# Patient Record
Sex: FEMALE | Employment: STUDENT | RURAL
[De-identification: names, ages, dates, MRNs, and addresses within clinical notes are randomized per-mention and may not be internally consistent; named-entity substitution may affect disease eponyms.]

---

## 2018-06-29 ENCOUNTER — OFFICE VISIT (OUTPATIENT)
Dept: FAMILY MEDICINE CLINIC | Age: 28
End: 2018-06-29

## 2018-06-29 VITALS
OXYGEN SATURATION: 98 % | DIASTOLIC BLOOD PRESSURE: 60 MMHG | RESPIRATION RATE: 20 BRPM | HEIGHT: 65 IN | HEART RATE: 81 BPM | BODY MASS INDEX: 26.29 KG/M2 | TEMPERATURE: 98.5 F | WEIGHT: 157.8 LBS | SYSTOLIC BLOOD PRESSURE: 92 MMHG

## 2018-06-29 DIAGNOSIS — Z23 ENCOUNTER FOR IMMUNIZATION: ICD-10-CM

## 2018-06-29 DIAGNOSIS — Z13.9 SCREENING FOR CONDITION: ICD-10-CM

## 2018-06-29 DIAGNOSIS — Z00.00 ROUTINE ADULT HEALTH MAINTENANCE: Primary | ICD-10-CM

## 2018-06-29 NOTE — PATIENT INSTRUCTIONS
Vaccine Information Statement     Tdap (Tetanus, Diphtheria, Pertussis) Vaccine: What You Need to Know    Many Vaccine Information Statements are available in Greek and other languages. See www.immunize.org/vis. Hojas de Información Sobre Vacunas están disponibles en español y en muchos otros idiomas. Visite LenchoScale.si    1. Why get vaccinated? Tetanus, diphtheria, and pertussis are very serious diseases. Tdap vaccine can protect us from these diseases. And, Tdap vaccine given to pregnant women can protect  babies against pertussis. TETANUS (Lockjaw) is rare in the [facility] today. It causes painful muscle tightening and stiffness, usually all over the body.  It can lead to tightening of muscles in the head and neck so you cant open your mouth, swallow, or sometimes even breathe. Tetanus kills about 1 out of 10 people who are infected even after receiving the best medical care. DIPHTHERIA is also rare in the [facility] today. It can cause a thick coating to form in the back of the throat.  It can lead to breathing problems, heart failure, paralysis, and death. PERTUSSIS (Whooping Cough) causes severe coughing spells, which can cause difficulty breathing, vomiting, and disturbed sleep.  It can also lead to weight loss, incontinence, and rib fractures. Up to 2 in 100 adolescents and 5 in 100 adults with pertussis are hospitalized or have complications, which could include pneumonia or death. These diseases are caused by bacteria. Diphtheria and pertussis are spread from person to person through secretions from coughing or sneezing. Tetanus enters the body through cuts, scratches, or wounds. Before vaccines, as many as 200,000 cases of diphtheria, 200,000 cases of pertussis, and hundreds of cases of tetanus, were reported in the [place] each year.  Since vaccination began, reports of cases for tetanus and diphtheria have dropped by about 99% and for pertussis by about 80%. 2. Tdap vaccine    Tdap vaccine can protect adolescents and adults from tetanus, diphtheria, and pertussis. One dose of Tdap is routinely given at age 6 or 15. People who did not get Tdap at that age should get it as soon as possible. Tdap is especially important for health care professionals and anyone having close contact with a baby younger than 12 months. Pregnant women should get a dose of Tdap during every pregnancy, to protect the  from pertussis. Infants are most at risk for severe, life-threatening complications from pertussis. Another vaccine, called Td, protects against tetanus and diphtheria, but not pertussis. A Td booster should be given every 10 years. Tdap may be given as one of these boosters if you have never gotten Tdap before. Tdap may also be given after a severe cut or burn to prevent tetanus infection. Your doctor or the person giving you the vaccine can give you more information. Tdap may safely be given at the same time as other vaccines. 3. Some people should not get this vaccine     A person who has ever had a life-threatening allergic reaction after a previous dose of any diphtheria, tetanus or pertussis containing vaccine, OR has a severe allergy to any part of this vaccine, should not get Tdap vaccine. Tell the person giving the vaccine about any severe allergies.  Anyone who had coma or long repeated seizures within 7 days after a childhood dose of DTP or DTaP, or a previous dose of Tdap, should not get Tdap, unless a cause other than the vaccine was found. They can still get Td.  Talk to your doctor if you:  - have seizures or another nervous system problem,  - had severe pain or swelling after any vaccine containing diphtheria, tetanus or pertussis,   - ever had a condition called Guillain Barré Syndrome (GBS),  - arent feeling well on the day the shot is scheduled.     4. Risks    With any medicine, including vaccines, there is a chance of side effects. These are usually mild and go away on their own. Serious reactions are also possible but are rare. Most people who get Tdap vaccine do not have any problems with it. Mild Problems following Tdap  (Did not interfere with activities)   Pain where the shot was given (about 3 in 4 adolescents or 2 in 3 adults)   Redness or swelling where the shot was given (about 1 person in 5)   Mild fever of at least 100.4°F (up to about 1 in 25 adolescents or 1 in 100 adults)   Headache (about 3 or 4 people in 10)   Tiredness (about 1 person in 3 or 4)   Nausea, vomiting, diarrhea, stomach ache (up to 1 in 4 adolescents or 1 in 10 adults)   Chills,  sore joints (about 1 person in 10)   Body aches (about 1 person in 3 or 4)    Rash, swollen glands (uncommon)    Moderate Problems following Tdap  (Interfered with activities, but did not require medical attention)   Pain where the shot was given (up to 1 in 5 or 6)    Redness or swelling where the shot was given (up to about 1 in 16 adolescents or 1 in 12 adults)   Fever over 102°F (about 1 in 100 adolescents or 1 in 250 adults)   Headache (about 1 in 7 adolescents or 1 in 10 adults)   Nausea, vomiting, diarrhea, stomach ache (up to 1 or 3 people in 100)   Swelling of the entire arm where the shot was given (up to about 1 in 500). Severe Problems following Tdap  (Unable to perform usual activities; required medical attention)   Swelling, severe pain, bleeding, and redness in the arm where the shot was given (rare). Problems that could happen after any vaccine:     People sometimes faint after a medical procedure, including vaccination. Sitting or lying down for about 15 minutes can help prevent fainting, and injuries caused by a fall. Tell your doctor if you feel dizzy, or have vision changes or ringing in the ears.      Some people get severe pain in the shoulder and have difficulty moving the arm where a shot was given. This happens very rarely.  Any medication can cause a severe allergic reaction. Such reactions from a vaccine are very rare, estimated at fewer than 1 in a million doses, and would happen within a few minutes to a few hours after the vaccination. As with any medicine, there is a very remote chance of a vaccine causing a serious injury or death. The safety of vaccines is always being monitored. For more information, visit: www.cdc.gov/vaccinesafety/    5. What if there is a serious problem? What should I look for?  Look for anything that concerns you, such as signs of a severe allergic reaction, very high fever, or unusual behavior.  Signs of a severe allergic reaction can include hives, swelling of the face and throat, difficulty breathing, a fast heartbeat, dizziness, and weakness. These would usually start a few minutes to a few hours after the vaccination. What should I do?  If you think it is a severe allergic reaction or other emergency that cant wait, call 9-1-1 or get the person to the nearest hospital. Otherwise, call your doctor.  Afterward, the reaction should be reported to the Vaccine Adverse Event Reporting System (VAERS). Your doctor might file this report, or you can do it yourself through the VAERS web site at www.vaers. [facility].gov, or by calling 8-824.867.1818. VAERS does not give medical advice. 6. The National Vaccine Injury Compensation Program    The [facility] Vaccine Injury Compensation Program (VICP) is a federal program that was created to compensate people who may have been injured by certain vaccines. Persons who believe they may have been injured by a vaccine can learn about the program and about filing a claim by calling 3-813.110.6575 or visiting the [place] website at www.[facility].gov/vaccinecompensation. There is a time limit to file a claim for compensation. 7. How can I learn more?  Ask your doctor.  He or she can give you the vaccine package insert or suggest other sources of information.  Call your local or state health department.  Contact the Centers for Disease Control and Prevention (CDC):  - Call 8-483.320.4224 (1-800-CDC-INFO) or  - Visit CDCs website at www.cdc.gov/vaccines      Vaccine Information Statement   Tdap Vaccine  (2/24/2015)  42 [facility] [id number]    Department of Health and Human Services  Centers for Disease Control and Prevention    Office Use Only

## 2018-06-29 NOTE — PROGRESS NOTES
Subjective:   32 y.o. female for Well Woman Check. Pt is NOT sexually active. 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 0.5 mL 0     No Known Allergies  Past Medical History:   Diagnosis Date    Psoriasis     scalp     Past Surgical History:   Procedure Laterality Date    HX WISDOM TEETH EXTRACTION       Family History   Problem Relation Age of Onset    Asthma Mother     Elevated Lipids Mother      Social History   Substance Use Topics    Smoking status: Never Smoker    Smokeless tobacco: Never Used    Alcohol use No             ROS: Feeling generally well. No TIA's or unusual headaches, no dysphagia. No prolonged cough. No dyspnea or chest pain on exertion. No abdominal pain, change in bowel habits, black or bloody stools. No urinary tract symptoms. No new or unusual musculoskeletal symptoms. Specific concerns today: she is moving to [address] [place] [address] to attend [employer]. Needs forms completed and titers drawn. Objective: The patient appears well, alert, oriented x 3, in no distress. Visit Vitals    BP 92/60 (BP 1 Location: Right arm, BP Patient Position: Sitting)  Comment: manual    Pulse 81    Temp 98.5 °F (36.9 °C) (Oral)    Resp 20    Ht 5' 5\" (1.651 m)    Wt 157 lb 12.8 oz (71.6 kg)    LMP 06/10/2018 (Exact Date)    SpO2 98%    BMI 26.26 kg/m2     ENT normal.  Neck supple. No adenopathy. ROSANGELA. Lungs are clear, good air entry, no wheezes, rhonchi or rales. S1 and S2 normal, no murmurs, regular rate and rhythm. Abdomen soft without tenderness, guarding, mass or organomegaly. Extremities show no edema, normal peripheral pulses.  Neurological is normal, no focal findings. Breast and Pelvic exams are deferred. Assessment/Plan:   Well Woman  routine labs ordered    ICD-10-CM ICD-9-CM    1. Routine adult health maintenance Z00.00 V70.0 CBC W/O DIFF      METABOLIC PANEL, COMPREHENSIVE      LIPID PANEL      TSH 3RD GENERATION      T4, FREE   2. Screening for condition Z13.9 V82.9 MEASLES/MUMPS/RUBELLA IMMUNITY      VZV AB, IGG      HEPATITIS B SURF AB QUANT      QUANTIFERON TB GOLD   3. Encounter for immunization Z23 V03.89 diph,pertuss,acel,,tetanus vac,PF, (ADACEL) 2 Lf-(2.5-5-3-5 mcg)-5Lf/0.5 mL syrg vaccine      CANCELED: TETANUS, DIPHTHERIA TOXOIDS AND ACELLULAR PERTUSSIS VACCINE (TDAP), IN INDIVIDS. >=7, IM      CANCELED: [place] IMMUNIZ ADMIN,1 SINGLE/COMB VAC/TOXOID     Labs drawn. Order TDAP to take at pharmacy. Will hold on to forms and e-mail to school when results available.

## 2018-06-29 NOTE — MR AVS SNAPSHOT
[address] [place] [address] 
 
 
 [facility] [address] [facility] 
[phone] Patient: Henny Jon MRN: [id number] TVI:[id number] Visit Information Date & Time Provider Department Dept. Phone Encounter #  
 [id number] 16:26 AM Maisha Hilliard Miguel Arthur [phone] [id number] Upcoming Health Maintenance Date Due  
 PAP AKA CERVICAL CYTOLOGY 11/16/2011 DTaP/Tdap/Td series (7 - Td) 11/1/2015 Influenza Age 5 to Adult 8/1/2018 Allergies as of 6/29/2018  Review Complete On: 9/27/1157 By: Maisha Hilliard MD  
 No Known Allergies Current Immunizations  Reviewed on 6/29/2018 Name Date DTAP Vaccine 1/27/1995, 9/4/1992, 8/3/1991, 3/27/1991, 1/23/1991 Hepatitis B Vaccine 8/31/1995, 2/26/1994, 8/29/1993 Human Papillomavirus 6/17/2009 IPV 1/27/1996, 9/4/1992, 3/27/1991, 1/23/1991 Influenza Vaccine Split 11/18/2008, 11/5/2002 Influenza Vaccine Whole 11/9/2006 MMR Vaccine 9/8/1995, 4/6/1992 Meningococcal Vaccine 11/9/2006 PPD 1/27/1985 TB Skin Test (PPD) 1/27/1995 TB Skin Test (PPD) Intradermal 7/12/2013 TDAP Vaccine 11/1/2005 Tdap  Incomplete Reviewed by Maisha Hilliard MD on 6/29/2018 at 10:28 AM  
You Were Diagnosed With   
  
 Codes Comments Routine adult health maintenance    -  Primary ICD-10-CM: Z00.00 ICD-9-CM: V70.0 Screening for condition     ICD-10-CM: Z13.9 ICD-9-CM: V82.9 Encounter for immunization     ICD-10-CM: H44 ICD-9-CM: V03.89 Vitals BP Pulse Temp Resp Height(growth percentile) Weight(growth percentile) 92/60 (BP 1 Location: Right arm, BP Patient Position: Sitting) 81 98.5 °F (36.9 °C) (Oral) 20 5' 5\" (1.651 m) 157 lb 12.8 oz (71.6 kg) LMP SpO2 BMI OB Status Smoking Status 06/10/2018 (Exact Date) 98% 26.26 kg/m2 Having regular periods Never Smoker Vitals History BMI and BSA Data Body Mass Index Body Surface Area 26.26 kg/m 2 1.81 m 2 Preferred Pharmacy Pharmacy Name Phone [facility] [address] [phone] Your Updated Medication List  
  
   
This list is accurate as of 18 11:01 AM.  Always use your most recent med list.  
  
  
  
  
 diph,pertuss(acel),tetanus vac(PF) 2 Lf-(2.5-5-3-5 mcg)-5Lf/0.5 mL Syrg vaccine Commonly known as:  ADACEL  
0.5 mL by IntraMUSCular route once for 1 dose. Prescriptions Printed Refills diph,pertuss,acel,,tetanus vac,PF, (ADACEL) 2 Lf-(2.5-5-3-5 mcg)-5Lf/0.5 mL syrg vaccine 0 Si.5 mL by IntraMUSCular route once for 1 dose. Class: Print Route: IntraMUSCular We Performed the Following CBC W/O DIFF [92613 CPT(R)] HEPATITIS B SURF AB QUANT [id number] CPT(R)] Comments:  
 Hep B AB Titers LIPID PANEL [89246 CPT(R)] MEASLES/MUMPS/RUBELLA IMMUNITY [[id number] Custom] METABOLIC PANEL, COMPREHENSIVE [00404 CPT(R)] [place] IMMUNIZ ADMIN,1 SINGLE/COMB VAC/TOXOID [id number] CPT(R)] QUANTIFERON TB GOLD [[id number] Custom] T4, FREE [id number] CPT(R)] TETANUS, DIPHTHERIA TOXOIDS AND ACELLULAR PERTUSSIS VACCINE (TDAP), IN INDIVIDS. >=7, IM [id number] CPT(R)] TSH 3RD GENERATION [35621 CPT(R)] VZV AB, IGG [id number] CPT(R)] Patient Instructions Vaccine Information Statement Tdap (Tetanus, Diphtheria, Pertussis) Vaccine: What You Need to Know Many Vaccine Information Statements are available in [place] and other languages. See www.immunize.org/vis. Hojas de Información Sobre Vacunas están disponibles en español y en muchos otros idiomas. Visite WorthScale.si 1. Why get vaccinated? Tetanus, diphtheria, and pertussis are very serious diseases. Tdap vaccine can protect us from these diseases. And, Tdap vaccine given to pregnant women can protect  babies against pertussis. TETANUS (Lockjaw) is rare in the [facility] today. It causes painful muscle tightening and stiffness, usually all over the body. ? It can lead to tightening of muscles in the head and neck so you cant open your mouth, swallow, or sometimes even breathe. Tetanus kills about 1 out of 10 people who are infected even after receiving the best medical care. DIPHTHERIA is also rare in the [facility] today. It can cause a thick coating to form in the back of the throat. ? It can lead to breathing problems, heart failure, paralysis, and death. PERTUSSIS (Whooping Cough) causes severe coughing spells, which can cause difficulty breathing, vomiting, and disturbed sleep. ? It can also lead to weight loss, incontinence, and rib fractures. Up to 2 in 100 adolescents and 5 in 100 adults with pertussis are hospitalized or have complications, which could include pneumonia or death. These diseases are caused by bacteria. Diphtheria and pertussis are spread from person to person through secretions from coughing or sneezing. Tetanus enters the body through cuts, scratches, or wounds. Before vaccines, as many as 200,000 cases of diphtheria, 200,000 cases of pertussis, and hundreds of cases of tetanus, were reported in the [place] each year. Since vaccination began, reports of cases for tetanus and diphtheria have dropped by about 99% and for pertussis by about 80%. 2. Tdap vaccine Tdap vaccine can protect adolescents and adults from tetanus, diphtheria, and pertussis. One dose of Tdap is routinely given at age 6 or 15. People who did not get Tdap at that age should get it as soon as possible. Tdap is especially important for health care professionals and anyone having close contact with a baby younger than 12 months. Pregnant women should get a dose of Tdap during every pregnancy, to protect the  from pertussis.   Infants are most at risk for severe, life-threatening complications from pertussis. Another vaccine, called Td, protects against tetanus and diphtheria, but not pertussis. A Td booster should be given every 10 years. Tdap may be given as one of these boosters if you have never gotten Tdap before. Tdap may also be given after a severe cut or burn to prevent tetanus infection. Your doctor or the person giving you the vaccine can give you more information. Tdap may safely be given at the same time as other vaccines. 3. Some people should not get this vaccine  A person who has ever had a life-threatening allergic reaction after a previous dose of any diphtheria, tetanus or pertussis containing vaccine, OR has a severe allergy to any part of this vaccine, should not get Tdap vaccine. Tell the person giving the vaccine about any severe allergies.  Anyone who had coma or long repeated seizures within 7 days after a childhood dose of DTP or DTaP, or a previous dose of Tdap, should not get Tdap, unless a cause other than the vaccine was found. They can still get Td.  Talk to your doctor if you: 
- have seizures or another nervous system problem, 
- had severe pain or swelling after any vaccine containing diphtheria, tetanus or pertussis,  
- ever had a condition called Guillain Barré Syndrome (GBS), 
- arent feeling well on the day the shot is scheduled. 4. Risks With any medicine, including vaccines, there is a chance of side effects. These are usually mild and go away on their own. Serious reactions are also possible but are rare. Most people who get Tdap vaccine do not have any problems with it. Mild Problems following Tdap 
(Did not interfere with activities)  Pain where the shot was given (about 3 in 4 adolescents or 2 in 3 adults)  Redness or swelling where the shot was given (about 1 person in 5)  Mild fever of at least 100.4°F (up to about 1 in 25 adolescents or 1 in 100 adults)  Headache (about 3 or 4 people in 10)  Tiredness (about 1 person in 3 or 4)  Nausea, vomiting, diarrhea, stomach ache (up to 1 in 4 adolescents or 1 in 10 adults)  Chills,  sore joints (about 1 person in 10)  Body aches (about 1 person in 3 or 4)  Rash, swollen glands (uncommon) Moderate Problems following Tdap (Interfered with activities, but did not require medical attention)  Pain where the shot was given (up to 1 in 5 or 6)  Redness or swelling where the shot was given (up to about 1 in 16 adolescents or 1 in 12 adults)  Fever over 102°F (about 1 in 100 adolescents or 1 in 250 adults)  Headache (about 1 in 7 adolescents or 1 in 10 adults)  Nausea, vomiting, diarrhea, stomach ache (up to 1 or 3 people in 100)  Swelling of the entire arm where the shot was given (up to about 1 in 500). Severe Problems following Tdap 
(Unable to perform usual activities; required medical attention)  Swelling, severe pain, bleeding, and redness in the arm where the shot was given (rare). Problems that could happen after any vaccine:  People sometimes faint after a medical procedure, including vaccination. Sitting or lying down for about 15 minutes can help prevent fainting, and injuries caused by a fall. Tell your doctor if you feel dizzy, or have vision changes or ringing in the ears.  Some people get severe pain in the shoulder and have difficulty moving the arm where a shot was given. This happens very rarely.  Any medication can cause a severe allergic reaction. Such reactions from a vaccine are very rare, estimated at fewer than 1 in a million doses, and would happen within a few minutes to a few hours after the vaccination. As with any medicine, there is a very remote chance of a vaccine causing a serious injury or death. The safety of vaccines is always being monitored. For more information, visit: www.cdc.gov/vaccinesafety/ 
 
 
The [facility] Vaccine Injury Compensation Program (VICP) is a federal program that was created to compensate people who may have been injured by certain vaccines. Persons who believe they may have been injured by a vaccine can learn about the program and about filing a claim by calling 3-144.571.3880 or visiting the [address] website at www.[facility].gov/vaccinecompensation. There is a time limit to file a claim for compensation. 7. How can I learn more?  Ask your doctor. He or she can give you the vaccine package insert or suggest other sources of information.  Call your local or state health department.  Contact the Centers for Disease Control and Prevention (CDC): 
- Call 5-977.873.1474 (1-800-CDC-INFO) or 
- Visit CDCs website at www.cdc.gov/vaccines Vaccine Information Statement Tdap Vaccine 
(2/24/2015) 42 [address] Arlettejes[address]s [id number] Department of Health and [employer] Centers for Disease Control and Prevention Office Use Only [facility]! Bolivar Gutierrez introduces [employer] patient portal. Now you can access parts of your medical record, email your doctor's office, and request medication refills online. 1. In your internet browser, go to [email or web address] 2. Click on the First Time User? Click Here link in the Sign In box. You will see the New Member Sign Up page. 3. Enter your [employer] Access Code exactly as it appears below. You will not need to use this code after youve completed the sign-up process. If you do not sign up before the expiration date, you must request a new code. · [employer] Access Code: [email or web address] Expires: 9/27/2018 10:39 AM 
 
4. Enter the last four digits of your Social Security Number (xxxx) and Date of Birth (mm/dd/yyyy) as indicated and click Submit. You will be taken to the next sign-up page. 5. Create a [employer]leas ID. This will be your [employer]as login ID and cannot be changed, so think of one that is secure and easy to remember. 6. Create a [employer]as password. You can change your password at any time. 7. Enter your Password Reset Question and Answer. This can be used at a later time if you forget your password. 8. Enter your e-mail address. You will receive e-mail notification when new information is available in [address] 9. Click Sign Up. You can now view and download portions of your medical record. 10. Click the Download Summary menu link to download a portable copy of your medical information. If you have questions, please visit the Frequently Asked Questions section of the [employer] website. Remember, [employer] is NOT to be used for urgent needs. For medical emergencies, dial 911. Now available from your iPhone and Android! Please provide this summary of care documentation to your next provider. Your primary care clinician is listed as Gillian Maldonado. If you have any questions after today's visit, please call [phone].

## 2018-06-29 NOTE — LETTER
7/5/2018 12:38 PM 
 
Ms. Cooper Brochure [address] [facility],[address]Th [address] [facility] [address] Dear Allison Brochure: 
 
Please find your most recent results below. Resulted Orders MEASLES/MUMPS/RUBELLA IMMUNITY Result Value Ref Range Rubella Ab, IgG 14.20 Immune >0.99 index Comment:  
                                   Non-immune       <0.90 Equivocal  0.90 - 0.99 Immune           >0.99 Rubeola Ab, IgG 110.0 Immune >29.9 AU/mL Comment:  
                                    Negative        <25.0 Equivocal 25.0 - 29.9 Positive        >29.9 Presence of antibodies to Rubeola is presumptive evidence 
of immunity except when acute infection is suspected. Mumps Abs, IgG 89.9 Immune >10.9 AU/mL Comment:  
                                   Negative         <9.0 Equivocal  9.0 - 10.9 Positive        >10.9 A positive result generally indicates past exposure to Mumps virus or previous vaccination. Narrative Performed at:  [address]  [id number] : Raymond Del Real MD, Phone:  [phone] VZV AB, IGG Result Value Ref Range VARICELLA ZOSTER  Immune >165 index Comment:  
                                  Negative          <135 Equivocal    135 - 165 Positive          >165 A positive result generally indicates exposure to the 
pathogen or administration of specific immunoglobulins, 
but it is not indication of active infection or stage 
of disease. Narrative Performed at:  [address]  [id number] : Raymond Del Real MD, Phone:  [phone] HEPATITIS B SURF AB QUANT Result Value Ref Range Hepatitis B surf Ab, QT 22.8 Immunity>9.9 mIU/mL Comment:  
     Status of Immunity                     Anti-HBs Level 
  ------------------                     -------------- Inconsistent with Immunity                   0.0 - 9.9 Consistent with Immunity                          >9.9 Narrative Performed at:  [address]  [id number] : See Leung MD, Phone:  [phone] CBC W/O DIFF Result Value Ref Range WBC 3.5 3.4 - 10.8 x10E3/uL  
 RBC 4.86 3.77 - 5.28 x10E6/uL HGB 14.0 11.1 - 15.9 g/dL HCT 40.9 34.0 - 46.6 % MCV 84 79 - 97 fL  
 MCH 28.8 26.6 - 33.0 pg  
 MCHC 34.2 31.5 - 35.7 g/dL  
 RDW 13.4 12.3 - 15.4 % PLATELET 900 196 - 615 x10E3/uL Narrative Performed at:  [address]  [id number] : See Leung MD, Phone:  [phone] METABOLIC PANEL, COMPREHENSIVE Result Value Ref Range Glucose 86 65 - 99 mg/dL BUN 9 6 - 20 mg/dL Creatinine 0.75 0.57 - 1.00 mg/dL GFR est non- >59 mL/min/1.73 GFR est  >59 mL/min/1.73  
 BUN/Creatinine ratio 12 9 - 23 Sodium 140 134 - 144 mmol/L Potassium 4.4 3.5 - 5.2 mmol/L Chloride 105 96 - 106 mmol/L  
 CO2 19 (L) 20 - 29 mmol/L Comment: **Please note reference interval change** Calcium 9.3 8.7 - 10.2 mg/dL Protein, total 7.2 6.0 - 8.5 g/dL Albumin 4.4 3.5 - 5.5 g/dL GLOBULIN, TOTAL 2.8 1.5 - 4.5 g/dL A-G Ratio 1.6 1.2 - 2.2 Bilirubin, total 0.8 0.0 - 1.2 mg/dL Alk. phosphatase 41 39 - 117 IU/L  
 AST (SGOT) 20 0 - 40 IU/L  
 ALT (SGPT) 20 0 - 32 IU/L Narrative Performed at:  [address]  [id number] : See Leung MD, Phone:  [phone] LIPID PANEL Result Value Ref Range Cholesterol, total 117 100 - 199 mg/dL Triglyceride 77 0 - 149 mg/dL HDL Cholesterol 47 >39 mg/dL VLDL, calculated 15 5 - 40 mg/dL LDL, calculated 55 0 - 99 mg/dL Narrative Performed at:  [address]  [id number] : Harper Uribe MD, Phone:  [phone] TSH 3RD GENERATION Result Value Ref Range TSH 3.290 0.450 - 4.500 uIU/mL Narrative Performed at:  [address]  [id number] : Harper Uribe MD, Phone:  [phone] T4, FREE Result Value Ref Range T4, Free 1.40 0.82 - 1.77 ng/dL Narrative Performed at:  [address]  [id number] : Harper Uribe MD, Phone:  [phone] QUANTIFERON TB GOLD Result Value Ref Range QuantiFERON Incubation Comment Comment:  
   Incubated, specimen forwarded to [employer] [facility], [place] for 
completion of the assay. Narrative Performed at:  [address]  [id number] : Harper Uribe MD, Phone:  [phone] QUANTIFERON IN TUBE REFL Result Value Ref Range QuantiFERON TB Gold Negative Negative QUANTIFERON CRITERIA Comment Comment: To be considered positive a specimen should have a TB Ag minus Nil 
value greater than or equal to 0.35 IU/mL and in addition the TB Ag 
minus Nil value must be greater than or equal to 25% of the Nil 
value. There may be insufficient information in these values to 
differentiate between some negative and some indeterminate test 
values. QuantiFERON TB Ag Value 0.08 IU/mL QuantiFERON Nil Value 0.08 IU/mL QuantiFERON Mitogen Value 8.56 IU/mL  
 QFT TB Ag minus Nil Value 0.00 IU/mL Interpretation: Comment Comment:  
   The QuantiFERON TB Gold (in Tube) assay is intended for use as an aid 
in the diagnosis of TB infection. Negative results suggest that there is no TB infection. In patients with high suspicion of exposure, a 
negative test should be repeated. A positive test indicates infection 
with Mycobacterium tuberculosis. Among individuals without 
tuberculosis infection, a positive test may be due to exposure to 
[place], M. szmendezgai or M. marinum. On the Internet, go to 
cdc.gov/tb for further details. Narrative Performed at:  [address]  [id number] : Kateryna Escobar MD, Phone:  [phone] CVD REPORT Result Value Ref Range INTERPRETATION Note Comment:  
   Supplemental report is available. Narrative Performed at:  [address] [place] [address]  [id number] : Verona Llanes MD, Phone:  [phone] RECOMMENDATIONS: 
Labs look good. Good titers of antibodies for MMR, Hep B, and Varicella.  Negative Quantiferon test. Normal thyroid level. Normal cholesterol level. Please call me if you have any questions: [phone] Sincerely, Deanne Thompson MD

## 2018-06-29 NOTE — PROGRESS NOTES
Identified pt with two pt identifiers(name and ). Chief Complaint   Patient presents with    Complete Physical        Health Maintenance Due   Topic    PAP AKA CERVICAL CYTOLOGY     DTaP/Tdap/Td series (7 - Td)       Wt Readings from Last 3 Encounters:   18 157 lb 12.8 oz (71.6 kg)   13 164 lb 12.8 oz (74.8 kg)   12 167 lb (75.8 kg)     Temp Readings from Last 3 Encounters:   18 98.5 °F (36.9 °C) (Oral)   13 97.8 °F (36.6 °C) (Oral)     BP Readings from Last 3 Encounters:   18 92/60   13 111/79   12 118/70     Pulse Readings from Last 3 Encounters:   18 81   13 83   12 72         Learning Assessment:  :     Learning Assessment 2018   PRIMARY LEARNER Patient   HIGHEST LEVEL OF EDUCATION - PRIMARY LEARNER  > 4 YEARS OF COLLEGE   BARRIERS PRIMARY LEARNER NONE   CO-LEARNER CAREGIVER No   PRIMARY LANGUAGE ENGLISH   LEARNER PREFERENCE PRIMARY DEMONSTRATION   ANSWERED BY self   RELATIONSHIP SELF       Depression Screening:  :     PHQ over the last two weeks 2018   Little interest or pleasure in doing things Not at all   Feeling down, depressed or hopeless Not at all   Total Score PHQ 2 0       Fall Risk Assessment:  :     No flowsheet data found. Abuse Screening:  :     Abuse Screening Questionnaire 2018   Do you ever feel afraid of your partner? N   Are you in a relationship with someone who physically or mentally threatens you? N   Is it safe for you to go home? Y       Coordination of Care Questionnaire:  :     1) Have you been to an emergency room, urgent care clinic since your last visit? no   Hospitalized since your last visit? no             2) Have you seen or consulted any other health care providers outside of [address] [place] [address] since your last visit? no  (Include any pap smears or colon screenings in this section.)    3) Do you have an Advance Directive on file?  no  Are you interested in receiving information about Advance Directives? no    Reviewed record in preparation for visit and have obtained necessary documentation. Medication reconciliation up to date and corrected with patient at this time.

## 2018-07-04 LAB
ALBUMIN SERPL-MCNC: 4.4 G/DL (ref 3.5–5.5)
ALBUMIN/GLOB SERPL: 1.6 {RATIO} (ref 1.2–2.2)
ALP SERPL-CCNC: 41 IU/L (ref 39–117)
ALT SERPL-CCNC: 20 IU/L (ref 0–32)
ANNOTATION COMMENT IMP: NORMAL
AST SERPL-CCNC: 20 IU/L (ref 0–40)
BILIRUB SERPL-MCNC: 0.8 MG/DL (ref 0–1.2)
BUN SERPL-MCNC: 9 MG/DL (ref 6–20)
BUN/CREAT SERPL: 12 (ref 9–23)
CALCIUM SERPL-MCNC: 9.3 MG/DL (ref 8.7–10.2)
CHLORIDE SERPL-SCNC: 105 MMOL/L (ref 96–106)
CHOLEST SERPL-MCNC: 117 MG/DL (ref 100–199)
CO2 SERPL-SCNC: 19 MMOL/L (ref 20–29)
CREAT SERPL-MCNC: 0.75 MG/DL (ref 0.57–1)
ERYTHROCYTE [DISTWIDTH] IN BLOOD BY AUTOMATED COUNT: 13.4 % (ref 12.3–15.4)
GAMMA INTERFERON BACKGROUND BLD IA-ACNC: 0.08 IU/ML
GLOBULIN SER CALC-MCNC: 2.8 G/DL (ref 1.5–4.5)
GLUCOSE SERPL-MCNC: 86 MG/DL (ref 65–99)
HBV SURFACE AB SER-ACNC: 22.8 MIU/ML
HCT VFR BLD AUTO: 40.9 % (ref 34–46.6)
HDLC SERPL-MCNC: 47 MG/DL
HGB BLD-MCNC: 14 G/DL (ref 11.1–15.9)
INTERPRETATION, 910389: NORMAL
LDLC SERPL CALC-MCNC: 55 MG/DL (ref 0–99)
M TB IFN-G BLD-IMP: NEGATIVE
M TB IFN-G CD4+ BCKGRND COR BLD-ACNC: 0 IU/ML
M TB IFN-G CD4+ T-CELLS BLD-ACNC: 0.08 IU/ML
MCH RBC QN AUTO: 28.8 PG (ref 26.6–33)
MCHC RBC AUTO-ENTMCNC: 34.2 G/DL (ref 31.5–35.7)
MCV RBC AUTO: 84 FL (ref 79–97)
MEV IGG SER IA-ACNC: 110 AU/ML
MITOGEN IGNF BLD-ACNC: 8.56 IU/ML
MUV IGG SER IA-ACNC: 89.9 AU/ML
PLATELET # BLD AUTO: 213 X10E3/UL (ref 150–379)
POTASSIUM SERPL-SCNC: 4.4 MMOL/L (ref 3.5–5.2)
PROT SERPL-MCNC: 7.2 G/DL (ref 6–8.5)
QUANTIFERON INCUBATION: NORMAL
RBC # BLD AUTO: 4.86 X10E6/UL (ref 3.77–5.28)
RUBV IGG SERPL IA-ACNC: 14.2 INDEX
SERVICE CMNT-IMP: NORMAL
SODIUM SERPL-SCNC: 140 MMOL/L (ref 134–144)
T4 FREE SERPL-MCNC: 1.4 NG/DL (ref 0.82–1.77)
TRIGL SERPL-MCNC: 77 MG/DL (ref 0–149)
TSH SERPL DL<=0.005 MIU/L-ACNC: 3.29 UIU/ML (ref 0.45–4.5)
VLDLC SERPL CALC-MCNC: 15 MG/DL (ref 5–40)
VZV IGG SER IA-ACNC: 644 INDEX
WBC # BLD AUTO: 3.5 X10E3/UL (ref 3.4–10.8)

## 2018-07-05 ENCOUNTER — TELEPHONE (OUTPATIENT)
Dept: FAMILY MEDICINE CLINIC | Age: 28
End: 2018-07-05

## 2018-07-05 NOTE — PROGRESS NOTES
Labs look good. Good titers of antibodies for MMR, Hep B, and Varicella. Negative Quantiferon test. Normal thyroid level. Normal cholesterol level.

## 2018-07-05 NOTE — TELEPHONE ENCOUNTER
She went to [facility] [phone]. She would like a copy e-mailed to her as well. Called [facility] - TDAP 6/29/18 she will fax me a copy.

## 2018-07-05 NOTE — TELEPHONE ENCOUNTER
LVM - we have everything except where you got your Tetanus. Please call me back with date and where, so we can get a copy.